# Patient Record
Sex: FEMALE | Race: WHITE | Employment: PART TIME | URBAN - METROPOLITAN AREA
[De-identification: names, ages, dates, MRNs, and addresses within clinical notes are randomized per-mention and may not be internally consistent; named-entity substitution may affect disease eponyms.]

---

## 2017-07-15 ENCOUNTER — HOSPITAL ENCOUNTER (EMERGENCY)
Facility: HOSPITAL | Age: 37
Discharge: HOME | End: 2017-07-15
Attending: EMERGENCY MEDICINE
Payer: COMMERCIAL

## 2017-07-15 VITALS
WEIGHT: 140 LBS | HEIGHT: 68 IN | RESPIRATION RATE: 16 BRPM | SYSTOLIC BLOOD PRESSURE: 122 MMHG | DIASTOLIC BLOOD PRESSURE: 80 MMHG | OXYGEN SATURATION: 100 % | TEMPERATURE: 97 F | BODY MASS INDEX: 21.22 KG/M2 | HEART RATE: 73 BPM

## 2017-07-15 DIAGNOSIS — S03.00XA JAW DISLOCATION, INITIAL ENCOUNTER: Primary | ICD-10-CM

## 2017-07-15 PROCEDURE — 21480 CLTX TMPRMAND DISLC 1ST/SBSQ: CPT

## 2017-07-15 PROCEDURE — 99282 EMERGENCY DEPT VISIT SF MDM: CPT

## 2017-07-15 PROCEDURE — 99283 EMERGENCY DEPT VISIT LOW MDM: CPT

## 2017-07-15 NOTE — ED PROVIDER NOTES
Patient Seen in: [facility] Emergency Department    History   Patient presents with:  Jaw Pain    Stated Complaint: dislocated jaw    HPI    Patient complains of possible jaw dislocation.   Patient notes that for some time since she was a child, her right-sided dislocation. There is some fullness of the right TMJ and mild localized tenderness here  Neck: Supple  Neurologic:  Mental status as above.   Patient moves all extremities with good strength      ED Course   Labs Reviewed - No data to display

## 2017-07-15 NOTE — ED NOTES
Pt unable to close jaw. Pt states R side seems to be slightly more painful. Pt given 20 ml syringe barrel to roll between her teeth per Dr. Alan Underwood.

## 2017-07-15 NOTE — ED INITIAL ASSESSMENT (HPI)
Pt unable to close her mouth. Pt states she yawned this morning and her jaw is stuck open. Pt states she is usually able to get it back in but not today. Pt is 12 weeks pregnant.

## 2018-06-21 ENCOUNTER — OFFICE VISIT (OUTPATIENT)
Dept: FAMILY MEDICINE CLINIC | Facility: CLINIC | Age: 38
End: 2018-06-21

## 2018-06-21 VITALS
SYSTOLIC BLOOD PRESSURE: 120 MMHG | BODY MASS INDEX: 20 KG/M2 | DIASTOLIC BLOOD PRESSURE: 70 MMHG | RESPIRATION RATE: 16 BRPM | HEIGHT: 68 IN | HEART RATE: 66 BPM | WEIGHT: 132 LBS | OXYGEN SATURATION: 99 %

## 2018-06-21 DIAGNOSIS — K90.0 CELIAC DISEASE: Primary | ICD-10-CM

## (undated) NOTE — ED AVS SNAPSHOT
[facility] Emergency Department  [place]  [address] [address]  Phone:  [phone]  Fax:  [phone]          Jaclyn De La Cruz   MRN: [id number]    Department:  [facility] Emergency Department   Date of Visit:  7/15/201 IF THERE IS ANY CHANGE OR WORSENING OF YOUR CONDITION, CALL YOUR PRIMARY CARE PHYSICIAN AT ONCE OR RETURN IMMEDIATELY TO THE EMERGENCY DEPARTMENT.     If you have been prescribed any medication(s), please fill your prescription right away and begin taking t